# Patient Record
Sex: FEMALE | Race: WHITE | NOT HISPANIC OR LATINO | URBAN - METROPOLITAN AREA
[De-identification: names, ages, dates, MRNs, and addresses within clinical notes are randomized per-mention and may not be internally consistent; named-entity substitution may affect disease eponyms.]

---

## 2017-06-13 ENCOUNTER — OUTPATIENT (OUTPATIENT)
Dept: OUTPATIENT SERVICES | Facility: HOSPITAL | Age: 32
LOS: 1 days | Discharge: HOME | End: 2017-06-13

## 2017-06-13 DIAGNOSIS — E28.2 POLYCYSTIC OVARIAN SYNDROME: ICD-10-CM

## 2017-06-27 ENCOUNTER — OUTPATIENT (OUTPATIENT)
Dept: OUTPATIENT SERVICES | Facility: HOSPITAL | Age: 32
LOS: 1 days | Discharge: HOME | End: 2017-06-27

## 2017-06-28 DIAGNOSIS — N92.6 IRREGULAR MENSTRUATION, UNSPECIFIED: ICD-10-CM

## 2017-06-28 DIAGNOSIS — Z01.818 ENCOUNTER FOR OTHER PREPROCEDURAL EXAMINATION: ICD-10-CM

## 2017-06-28 DIAGNOSIS — N70.11 CHRONIC SALPINGITIS: ICD-10-CM

## 2017-07-11 DIAGNOSIS — N73.6 FEMALE PELVIC PERITONEAL ADHESIONS (POSTINFECTIVE): ICD-10-CM

## 2017-07-11 DIAGNOSIS — N70.11 CHRONIC SALPINGITIS: ICD-10-CM

## 2018-02-13 ENCOUNTER — OUTPATIENT (OUTPATIENT)
Dept: OUTPATIENT SERVICES | Facility: HOSPITAL | Age: 33
LOS: 1 days | Discharge: HOME | End: 2018-02-13

## 2018-02-13 DIAGNOSIS — Z34.00 ENCOUNTER FOR SUPERVISION OF NORMAL FIRST PREGNANCY, UNSPECIFIED TRIMESTER: ICD-10-CM

## 2018-04-10 ENCOUNTER — OUTPATIENT (OUTPATIENT)
Dept: OUTPATIENT SERVICES | Facility: HOSPITAL | Age: 33
LOS: 1 days | Discharge: HOME | End: 2018-04-10

## 2018-04-10 DIAGNOSIS — N89.8 OTHER SPECIFIED NONINFLAMMATORY DISORDERS OF VAGINA: ICD-10-CM

## 2018-05-01 ENCOUNTER — OUTPATIENT (OUTPATIENT)
Dept: OUTPATIENT SERVICES | Facility: HOSPITAL | Age: 33
LOS: 1 days | Discharge: HOME | End: 2018-05-01

## 2018-05-01 DIAGNOSIS — Z01.818 ENCOUNTER FOR OTHER PREPROCEDURAL EXAMINATION: ICD-10-CM

## 2018-05-01 LAB
ALBUMIN SERPL ELPH-MCNC: 3.3 G/DL — LOW (ref 3.5–5.2)
ALP SERPL-CCNC: 167 U/L — HIGH (ref 30–115)
ALT FLD-CCNC: 11 U/L — SIGNIFICANT CHANGE UP (ref 0–41)
ANION GAP SERPL CALC-SCNC: 18 MMOL/L — HIGH (ref 7–14)
APPEARANCE UR: (no result)
APTT BLD: 24.4 SEC — LOW (ref 27–39.2)
AST SERPL-CCNC: 17 U/L — SIGNIFICANT CHANGE UP (ref 0–41)
BACTERIA # UR AUTO: (no result) /HPF
BASOPHILS # BLD AUTO: 0.05 K/UL — SIGNIFICANT CHANGE UP (ref 0–0.2)
BASOPHILS NFR BLD AUTO: 0.6 % — SIGNIFICANT CHANGE UP (ref 0–1)
BILIRUB SERPL-MCNC: 0.4 MG/DL — SIGNIFICANT CHANGE UP (ref 0.2–1.2)
BILIRUB UR-MCNC: NEGATIVE — SIGNIFICANT CHANGE UP
BLD GP AB SCN SERPL QL: SIGNIFICANT CHANGE UP
BUN SERPL-MCNC: 9 MG/DL — LOW (ref 10–20)
CALCIUM SERPL-MCNC: 8.6 MG/DL — SIGNIFICANT CHANGE UP (ref 8.5–10.1)
CHLORIDE SERPL-SCNC: 101 MMOL/L — SIGNIFICANT CHANGE UP (ref 98–110)
CO2 SERPL-SCNC: 19 MMOL/L — SIGNIFICANT CHANGE UP (ref 17–32)
COLOR SPEC: YELLOW — SIGNIFICANT CHANGE UP
CREAT SERPL-MCNC: 0.6 MG/DL — LOW (ref 0.7–1.5)
DIFF PNL FLD: NEGATIVE — SIGNIFICANT CHANGE UP
EOSINOPHIL # BLD AUTO: 0.06 K/UL — SIGNIFICANT CHANGE UP (ref 0–0.7)
EOSINOPHIL NFR BLD AUTO: 0.7 % — SIGNIFICANT CHANGE UP (ref 0–8)
EPI CELLS # UR: (no result) /HPF
GLUCOSE SERPL-MCNC: 51 MG/DL — LOW (ref 70–99)
GLUCOSE UR QL: NEGATIVE MG/DL — SIGNIFICANT CHANGE UP
HCT VFR BLD CALC: 33.2 % — LOW (ref 37–47)
HGB BLD-MCNC: 10.2 G/DL — LOW (ref 12–16)
IMM GRANULOCYTES NFR BLD AUTO: 0.3 % — SIGNIFICANT CHANGE UP (ref 0.1–0.3)
INR BLD: 0.94 RATIO — SIGNIFICANT CHANGE UP (ref 0.65–1.3)
KETONES UR-MCNC: NEGATIVE — SIGNIFICANT CHANGE UP
LEUKOCYTE ESTERASE UR-ACNC: (no result)
LYMPHOCYTES # BLD AUTO: 2.42 K/UL — SIGNIFICANT CHANGE UP (ref 1.2–3.4)
LYMPHOCYTES # BLD AUTO: 27.1 % — SIGNIFICANT CHANGE UP (ref 20.5–51.1)
MCHC RBC-ENTMCNC: 20.6 PG — LOW (ref 27–31)
MCHC RBC-ENTMCNC: 30.7 G/DL — LOW (ref 32–37)
MCV RBC AUTO: 66.9 FL — LOW (ref 81–99)
MONOCYTES # BLD AUTO: 1 K/UL — HIGH (ref 0.1–0.6)
MONOCYTES NFR BLD AUTO: 11.2 % — HIGH (ref 1.7–9.3)
NEUTROPHILS # BLD AUTO: 5.36 K/UL — SIGNIFICANT CHANGE UP (ref 1.4–6.5)
NEUTROPHILS NFR BLD AUTO: 60.1 % — SIGNIFICANT CHANGE UP (ref 42.2–75.2)
NITRITE UR-MCNC: NEGATIVE — SIGNIFICANT CHANGE UP
NRBC # BLD: 0 /100 WBCS — SIGNIFICANT CHANGE UP (ref 0–0)
PH UR: 6.5 — SIGNIFICANT CHANGE UP (ref 5–8)
PLATELET # BLD AUTO: 393 K/UL — SIGNIFICANT CHANGE UP (ref 130–400)
POTASSIUM SERPL-MCNC: 4.4 MMOL/L — SIGNIFICANT CHANGE UP (ref 3.5–5)
POTASSIUM SERPL-SCNC: 4.4 MMOL/L — SIGNIFICANT CHANGE UP (ref 3.5–5)
PROT SERPL-MCNC: 5.8 G/DL — LOW (ref 6–8)
PROT UR-MCNC: NEGATIVE MG/DL — SIGNIFICANT CHANGE UP
PROTHROM AB SERPL-ACNC: 10.1 SEC — SIGNIFICANT CHANGE UP (ref 9.95–12.87)
RBC # BLD: 4.96 M/UL — SIGNIFICANT CHANGE UP (ref 4.2–5.4)
RBC # FLD: 18.6 % — HIGH (ref 11.5–14.5)
SODIUM SERPL-SCNC: 138 MMOL/L — SIGNIFICANT CHANGE UP (ref 135–146)
SP GR SPEC: 1.01 — SIGNIFICANT CHANGE UP (ref 1.01–1.03)
TYPE + AB SCN PNL BLD: SIGNIFICANT CHANGE UP
UROBILINOGEN FLD QL: 0.2 MG/DL — SIGNIFICANT CHANGE UP (ref 0.2–0.2)
WBC # BLD: 8.92 K/UL — SIGNIFICANT CHANGE UP (ref 4.8–10.8)
WBC # FLD AUTO: 8.92 K/UL — SIGNIFICANT CHANGE UP (ref 4.8–10.8)

## 2018-05-02 LAB — T PALLIDUM AB TITR SER: NEGATIVE — SIGNIFICANT CHANGE UP

## 2018-05-03 ENCOUNTER — RESULT REVIEW (OUTPATIENT)
Age: 33
End: 2018-05-03

## 2018-05-03 ENCOUNTER — INPATIENT (INPATIENT)
Facility: HOSPITAL | Age: 33
LOS: 2 days | Discharge: HOME | End: 2018-05-06
Attending: OBSTETRICS & GYNECOLOGY | Admitting: OBSTETRICS & GYNECOLOGY

## 2018-05-03 VITALS — TEMPERATURE: 98 F

## 2018-05-03 DIAGNOSIS — K95.09 OTHER COMPLICATIONS OF GASTRIC BAND PROCEDURE: Chronic | ICD-10-CM

## 2018-05-03 DIAGNOSIS — Z90.3 ACQUIRED ABSENCE OF STOMACH [PART OF]: Chronic | ICD-10-CM

## 2018-05-03 DIAGNOSIS — Z90.721 ACQUIRED ABSENCE OF OVARIES, UNILATERAL: Chronic | ICD-10-CM

## 2018-05-03 DIAGNOSIS — Z98.82 BREAST IMPLANT STATUS: Chronic | ICD-10-CM

## 2018-05-03 DIAGNOSIS — Z98.84 BARIATRIC SURGERY STATUS: Chronic | ICD-10-CM

## 2018-05-03 DIAGNOSIS — Z98.890 OTHER SPECIFIED POSTPROCEDURAL STATES: Chronic | ICD-10-CM

## 2018-05-03 LAB
AMPHET UR-MCNC: NEGATIVE — SIGNIFICANT CHANGE UP
APPEARANCE UR: (no result)
BACTERIA # UR AUTO: (no result) /HPF
BARBITURATES UR SCN-MCNC: NEGATIVE — SIGNIFICANT CHANGE UP
BENZODIAZ UR-MCNC: NEGATIVE — SIGNIFICANT CHANGE UP
BILIRUB UR-MCNC: (no result)
COCAINE METAB.OTHER UR-MCNC: NEGATIVE — SIGNIFICANT CHANGE UP
COLOR SPEC: SIGNIFICANT CHANGE UP
DIFF PNL FLD: NEGATIVE — SIGNIFICANT CHANGE UP
EPI CELLS # UR: (no result) /HPF
GLUCOSE UR QL: NEGATIVE MG/DL — SIGNIFICANT CHANGE UP
KETONES UR-MCNC: NEGATIVE — SIGNIFICANT CHANGE UP
LEUKOCYTE ESTERASE UR-ACNC: (no result)
METHADONE UR-MCNC: NEGATIVE — SIGNIFICANT CHANGE UP
NITRITE UR-MCNC: NEGATIVE — SIGNIFICANT CHANGE UP
OPIATES UR-MCNC: NEGATIVE — SIGNIFICANT CHANGE UP
PCP SPEC-MCNC: SIGNIFICANT CHANGE UP
PH UR: 6 — SIGNIFICANT CHANGE UP (ref 5–8)
PROPOXYPHENE QUALITATIVE URINE RESULT: NEGATIVE — SIGNIFICANT CHANGE UP
PROT UR-MCNC: 30 MG/DL
SP GR SPEC: 1.02 — SIGNIFICANT CHANGE UP (ref 1.01–1.03)
UROBILINOGEN FLD QL: 1 MG/DL (ref 0.2–0.2)
WBC UR QL: (no result) /HPF

## 2018-05-03 RX ORDER — KETOROLAC TROMETHAMINE 30 MG/ML
30 SYRINGE (ML) INJECTION ONCE
Qty: 0 | Refills: 0 | Status: CANCELLED | OUTPATIENT
Start: 2018-05-03 | End: 2018-05-06

## 2018-05-03 RX ORDER — MORPHINE SULFATE 50 MG/1
2 CAPSULE, EXTENDED RELEASE ORAL ONCE
Qty: 0 | Refills: 0 | Status: CANCELLED | OUTPATIENT
Start: 2018-05-03 | End: 2018-05-06

## 2018-05-03 RX ORDER — IBUPROFEN 200 MG
600 TABLET ORAL EVERY 6 HOURS
Qty: 0 | Refills: 0 | Status: DISCONTINUED | OUTPATIENT
Start: 2018-05-03 | End: 2018-05-06

## 2018-05-03 RX ORDER — CEFAZOLIN SODIUM 1 G
2000 VIAL (EA) INJECTION EVERY 8 HOURS
Qty: 0 | Refills: 0 | Status: DISCONTINUED | OUTPATIENT
Start: 2018-05-03 | End: 2018-05-06

## 2018-05-03 RX ORDER — SODIUM CHLORIDE 9 MG/ML
300 INJECTION, SOLUTION INTRAVENOUS ONCE
Qty: 0 | Refills: 0 | Status: DISCONTINUED | OUTPATIENT
Start: 2018-05-03 | End: 2018-05-03

## 2018-05-03 RX ORDER — OXYCODONE HYDROCHLORIDE 5 MG/1
10 TABLET ORAL
Qty: 0 | Refills: 0 | Status: DISCONTINUED | OUTPATIENT
Start: 2018-05-03 | End: 2018-05-06

## 2018-05-03 RX ORDER — CEFAZOLIN SODIUM 1 G
VIAL (EA) INJECTION
Qty: 0 | Refills: 0 | Status: DISCONTINUED | OUTPATIENT
Start: 2018-05-03 | End: 2018-05-03

## 2018-05-03 RX ORDER — SODIUM CHLORIDE 9 MG/ML
1000 INJECTION, SOLUTION INTRAVENOUS
Qty: 0 | Refills: 0 | Status: DISCONTINUED | OUTPATIENT
Start: 2018-05-03 | End: 2018-05-06

## 2018-05-03 RX ORDER — LANOLIN
1 OINTMENT (GRAM) TOPICAL
Qty: 0 | Refills: 0 | Status: DISCONTINUED | OUTPATIENT
Start: 2018-05-03 | End: 2018-05-06

## 2018-05-03 RX ORDER — SIMETHICONE 80 MG/1
80 TABLET, CHEWABLE ORAL EVERY 4 HOURS
Qty: 0 | Refills: 0 | Status: DISCONTINUED | OUTPATIENT
Start: 2018-05-03 | End: 2018-05-06

## 2018-05-03 RX ORDER — ENOXAPARIN SODIUM 100 MG/ML
40 INJECTION SUBCUTANEOUS AT BEDTIME
Qty: 0 | Refills: 0 | Status: DISCONTINUED | OUTPATIENT
Start: 2018-05-03 | End: 2018-05-06

## 2018-05-03 RX ORDER — CEFAZOLIN SODIUM 1 G
1000 VIAL (EA) INJECTION EVERY 8 HOURS
Qty: 0 | Refills: 0 | Status: DISCONTINUED | OUTPATIENT
Start: 2018-05-03 | End: 2018-05-03

## 2018-05-03 RX ORDER — SODIUM CHLORIDE 9 MG/ML
1000 INJECTION, SOLUTION INTRAVENOUS
Qty: 0 | Refills: 0 | Status: DISCONTINUED | OUTPATIENT
Start: 2018-05-03 | End: 2018-05-03

## 2018-05-03 RX ORDER — DOCUSATE SODIUM 100 MG
100 CAPSULE ORAL
Qty: 0 | Refills: 0 | Status: DISCONTINUED | OUTPATIENT
Start: 2018-05-03 | End: 2018-05-06

## 2018-05-03 RX ORDER — OXYTOCIN 10 UNIT/ML
41.67 VIAL (ML) INJECTION
Qty: 20 | Refills: 0 | Status: DISCONTINUED | OUTPATIENT
Start: 2018-05-03 | End: 2018-05-06

## 2018-05-03 RX ORDER — KETOROLAC TROMETHAMINE 30 MG/ML
30 SYRINGE (ML) INJECTION ONCE
Qty: 0 | Refills: 0 | Status: DISCONTINUED | OUTPATIENT
Start: 2018-05-03 | End: 2018-05-03

## 2018-05-03 RX ORDER — OXYCODONE AND ACETAMINOPHEN 5; 325 MG/1; MG/1
1 TABLET ORAL EVERY 4 HOURS
Qty: 0 | Refills: 0 | Status: CANCELLED | OUTPATIENT
Start: 2018-05-03 | End: 2018-05-06

## 2018-05-03 RX ORDER — GLYCERIN ADULT
1 SUPPOSITORY, RECTAL RECTAL AT BEDTIME
Qty: 0 | Refills: 0 | Status: DISCONTINUED | OUTPATIENT
Start: 2018-05-03 | End: 2018-05-06

## 2018-05-03 RX ORDER — ONDANSETRON 8 MG/1
4 TABLET, FILM COATED ORAL ONCE
Qty: 0 | Refills: 0 | Status: CANCELLED | OUTPATIENT
Start: 2018-05-03 | End: 2018-05-06

## 2018-05-03 RX ORDER — BUTORPHANOL TARTRATE 2 MG/ML
2 INJECTION, SOLUTION INTRAMUSCULAR; INTRAVENOUS ONCE
Qty: 0 | Refills: 0 | Status: DISCONTINUED | OUTPATIENT
Start: 2018-05-03 | End: 2018-05-06

## 2018-05-03 RX ORDER — NALOXONE HYDROCHLORIDE 4 MG/.1ML
0.1 SPRAY NASAL
Qty: 0 | Refills: 0 | Status: DISCONTINUED | OUTPATIENT
Start: 2018-05-03 | End: 2018-05-06

## 2018-05-03 RX ORDER — DIPHENHYDRAMINE HCL 50 MG
25 CAPSULE ORAL EVERY 6 HOURS
Qty: 0 | Refills: 0 | Status: DISCONTINUED | OUTPATIENT
Start: 2018-05-03 | End: 2018-05-06

## 2018-05-03 RX ORDER — KETOROLAC TROMETHAMINE 30 MG/ML
30 SYRINGE (ML) INJECTION ONCE
Qty: 0 | Refills: 0 | Status: DISCONTINUED | OUTPATIENT
Start: 2018-05-03 | End: 2018-05-06

## 2018-05-03 RX ORDER — FERROUS SULFATE 325(65) MG
325 TABLET ORAL DAILY
Qty: 0 | Refills: 0 | Status: DISCONTINUED | OUTPATIENT
Start: 2018-05-03 | End: 2018-05-06

## 2018-05-03 RX ORDER — OXYCODONE AND ACETAMINOPHEN 5; 325 MG/1; MG/1
2 TABLET ORAL EVERY 6 HOURS
Qty: 0 | Refills: 0 | Status: CANCELLED | OUTPATIENT
Start: 2018-05-03 | End: 2018-05-06

## 2018-05-03 RX ORDER — CEFAZOLIN SODIUM 1 G
2000 VIAL (EA) INJECTION ONCE
Qty: 0 | Refills: 0 | Status: COMPLETED | OUTPATIENT
Start: 2018-05-03 | End: 2018-05-03

## 2018-05-03 RX ORDER — CITRIC ACID/SODIUM CITRATE 300-500 MG
15 SOLUTION, ORAL ORAL EVERY 4 HOURS
Qty: 0 | Refills: 0 | Status: DISCONTINUED | OUTPATIENT
Start: 2018-05-03 | End: 2018-05-03

## 2018-05-03 RX ORDER — MORPHINE SULFATE 50 MG/1
0.2 CAPSULE, EXTENDED RELEASE ORAL ONCE
Qty: 0 | Refills: 0 | Status: DISCONTINUED | OUTPATIENT
Start: 2018-05-03 | End: 2018-05-06

## 2018-05-03 RX ORDER — OXYCODONE AND ACETAMINOPHEN 5; 325 MG/1; MG/1
2 TABLET ORAL EVERY 6 HOURS
Qty: 0 | Refills: 0 | Status: DISCONTINUED | OUTPATIENT
Start: 2018-05-03 | End: 2018-05-06

## 2018-05-03 RX ORDER — ONDANSETRON 8 MG/1
4 TABLET, FILM COATED ORAL EVERY 6 HOURS
Qty: 0 | Refills: 0 | Status: DISCONTINUED | OUTPATIENT
Start: 2018-05-03 | End: 2018-05-06

## 2018-05-03 RX ORDER — OXYCODONE HYDROCHLORIDE 5 MG/1
5 TABLET ORAL
Qty: 0 | Refills: 0 | Status: DISCONTINUED | OUTPATIENT
Start: 2018-05-03 | End: 2018-05-06

## 2018-05-03 RX ORDER — OXYTOCIN 10 UNIT/ML
333.33 VIAL (ML) INJECTION
Qty: 20 | Refills: 0 | Status: DISCONTINUED | OUTPATIENT
Start: 2018-05-03 | End: 2018-05-03

## 2018-05-03 RX ORDER — OXYCODONE AND ACETAMINOPHEN 5; 325 MG/1; MG/1
1 TABLET ORAL
Qty: 0 | Refills: 0 | Status: DISCONTINUED | OUTPATIENT
Start: 2018-05-03 | End: 2018-05-06

## 2018-05-03 RX ORDER — ACETAMINOPHEN 500 MG
650 TABLET ORAL EVERY 6 HOURS
Qty: 0 | Refills: 0 | Status: DISCONTINUED | OUTPATIENT
Start: 2018-05-03 | End: 2018-05-06

## 2018-05-03 RX ADMIN — Medication 100 MILLIGRAM(S): at 22:04

## 2018-05-03 RX ADMIN — ENOXAPARIN SODIUM 40 MILLIGRAM(S): 100 INJECTION SUBCUTANEOUS at 22:04

## 2018-05-03 RX ADMIN — Medication 100 MILLIGRAM(S): at 10:26

## 2018-05-03 RX ADMIN — Medication 30 MILLIGRAM(S): at 23:26

## 2018-05-03 NOTE — BRIEF OPERATIVE NOTE - PROCEDURE
<<-----Click on this checkbox to enter Procedure Primary low transverse  section  2018    Active  ATANG2

## 2018-05-03 NOTE — OB PROVIDER H&P - PSH
Complication of gastric banding  2011, required blood transfusion, band removed at this time  H/O abdominoplasty  2013  H/O breast augmentation  2011  History of laparoscopic adjustable gastric banding  2005  History of sleeve gastrectomy  2013  S/P unilateral salpingo-oophorectomy  left

## 2018-05-03 NOTE — CHART NOTE - NSCHARTNOTEFT_GEN_A_CORE
PACU ANESTHESIA ADMISSION NOTE      Procedure: Primary low transverse  section    Post op diagnosis:  Breech presentation delivered  Term pregnancy delivered      ____  Intubated  TV:______       Rate: ______      FiO2: ______    _x___  Patent Airway    _x___  Full return of protective reflexes    ____  Full recovery from anesthesia / back to baseline status    Vitals  SPO2:-100% on room air  HR:-74  RR:-12  B.P:-119/56  Temp:-98.2    Mental Status:  _x___ Awake   ___x_ Alert   _____ Drowsy   _____ Sedated    Nausea/Vomiting:  _x___  NO       ______Yes,   See Post - Op Orders         Pain Scale (0-10):  __0___    Treatment: _x___ None    __x__ See Post - Op/PCA Orders    Post - Operative Fluids:   ___ Oral   ____x See Post - Op Orders    Plan: Discharge:   ____Home       ___x__Floor     _____Critical Care    _____  Other:_________________    Comments:  Report endorsed to RN in pacu  Vitals stable  No anesthesia issues or complications noted.  Discharge to patient to floor when criteria met.

## 2018-05-03 NOTE — OB PROVIDER H&P - HISTORY OF PRESENT ILLNESS
Pt is a 32y.o.  @ 39.5wks IVF pregnancy with Day 5 FET here for primary  for breech presentation. Pt denies ctx, ROM, VB and states good FM

## 2018-05-03 NOTE — PROGRESS NOTE ADULT - SUBJECTIVE AND OBJECTIVE BOX
PGY 1 Post Op c/s note    Pt seen and evaluated at bedside, POD0 , recovering well, no complaints at this time. Pt is OOB to chair, pain well controlled. Yi N/V or abdominal pain. urrently NPO  Billy in place.  Bottlefeeding.     Physical Exam  Vital Signs Last 24 Hrs  T(C): 35.9 (03 May 2018 15:31), Max: 36.7 (03 May 2018 09:43)  T(F): 96.7 (03 May 2018 15:31), Max: 98.1 (03 May 2018 09:43)  HR: 63 (03 May 2018 15:31) (59 - 83)  BP: 114/57 (03 May 2018 15:31) (104/59 - 125/85)  RR: 18 (03 May 2018 15:31) (18 - 20)  SpO2: 98% (03 May 2018 13:52) (97% - 99%)    UO 325cc in last 2 hours, 162cc/h (adequate is 42cc/h)    Gen: NAD  CVS: RRR, normal S1, S2  Lungs: CTAB  Abdomen: soft, non tender, non distended, +BS  -Incision: dressing in place. No surrounding tenderness or erythema  -Fundus: firm, appropriately tender, below umbilicus  LE: no edema or tenderness  Lochia: Minimal Rubra    Labs                        10.2   8.92  )-----------( 393      ( 01 May 2018 17:12 )             33.2         Meds  acetaminophen   Tablet 650 milliGRAM(s) Oral every 6 hours PRN  butorphanol Injectable 2 milliGRAM(s) IV Push once PRN  ceFAZolin   IVPB 2000 milliGRAM(s) IV Intermittent every 8 hours  diphenhydrAMINE   Capsule 25 milliGRAM(s) Oral every 6 hours PRN  docusate sodium 100 milliGRAM(s) Oral two times a day PRN  enoxaparin Injectable 40 milliGRAM(s) SubCutaneous at bedtime  ferrous    sulfate 325 milliGRAM(s) Oral daily  glycerin Suppository - Adult 1 Suppository(s) Rectal at bedtime PRN  ibuprofen  Tablet 600 milliGRAM(s) Oral every 6 hours PRN  ketorolac   Injectable 30 milliGRAM(s) IV Push once PRN  lactated ringers. 1000 milliLiter(s) IV Continuous <Continuous>  lactated ringers. 1000 milliLiter(s) IV Continuous <Continuous>  lanolin Ointment 1 Application(s) Topical every 3 hours PRN  morphine PF Spinal 0.2 milliGRAM(s) IntraThecal. once  naloxone Injectable 0.1 milliGRAM(s) IV Push every 3 minutes PRN  ondansetron Injectable 4 milliGRAM(s) IV Push every 6 hours PRN  oxyCODONE    5 mG/acetaminophen 325 mG 1 Tablet(s) Oral every 3 hours PRN  oxyCODONE    5 mG/acetaminophen 325 mG 2 Tablet(s) Oral every 6 hours PRN  oxyCODONE    IR 5 milliGRAM(s) Oral every 3 hours PRN  oxyCODONE    IR 10 milliGRAM(s) Oral every 3 hours PRN  oxytocin Infusion 41.667 milliUNIT(s)/Min IV Continuous <Continuous>  prenatal multivitamin 1 Tablet(s) Oral daily  simethicone 80 milliGRAM(s) Chew every 4 hours PRN      A/P  32y P1, s/p primary c/s for breech presentation POD0, recovering well.  -OOB to chair and clear liquids at 1800  -UO adequate, will d/c billy after duramorph if remains adequate.  -Encourage PO hydration, ambulation, incentive spirometry

## 2018-05-03 NOTE — BRIEF OPERATIVE NOTE - OPERATION/FINDINGS
Live male infant delivered in footling breech presentation, APGARs 9/9, bwt 3330g. Surgically absent left fallopian tube. Normal appearing right fallopian tube, uterus, and bilateral ovaries.

## 2018-05-03 NOTE — BRIEF OPERATIVE NOTE - POST-OP DX
Breech presentation delivered  05/03/2018    Ivon Cabezas  Term pregnancy delivered  05/03/2018    Ivon Cabezas

## 2018-05-03 NOTE — OB PROVIDER H&P - ASSESSMENT
32y.o.  @ 39.5wks primary  for breech presentation, IVF pregnancy, h/o lap band then gastric sleeve, h/o left salpingooophrectemy, s/p abdominoplasty, h/o blood transfusion  Admit to L&D  IVF, labs  Continuous efm and toco  Ancef prior to OR  Abdominal prep  Norwood catheter  Dr. Montgomery/ Dr. Moy Aware

## 2018-05-03 NOTE — OB PROVIDER H&P - NSHPLABSRESULTS_GEN_ALL_CORE
GTT Pt unable to tolerate, HGB A1c 4.9 3/19/18  NIPT 46XY WNL  Seq 1/ Seq 2 negative    Sono @ 12.3wks S=D, NT WNL  Sono @ 15.3wks S=D  Sono @ 19.4wks S=D, nl anatomy  Sono @ 29.4wks S=D, EFW: 1534g, 3'6" (58%) vtx. BPP 8/8, nl dopplers

## 2018-05-04 DIAGNOSIS — O34.211 MATERNAL CARE FOR LOW TRANSVERSE SCAR FROM PREVIOUS CESAREAN DELIVERY: ICD-10-CM

## 2018-05-04 LAB
FETAL SCREEN: SIGNIFICANT CHANGE UP
HCT VFR BLD CALC: 25.8 % — LOW (ref 37–47)
HGB BLD-MCNC: 8 G/DL — LOW (ref 12–16)
MCHC RBC-ENTMCNC: 20.6 PG — LOW (ref 27–31)
MCHC RBC-ENTMCNC: 31 G/DL — LOW (ref 32–37)
MCV RBC AUTO: 66.3 FL — LOW (ref 81–99)
NRBC # BLD: 0 /100 WBCS — SIGNIFICANT CHANGE UP (ref 0–0)
PLATELET # BLD AUTO: 298 K/UL — SIGNIFICANT CHANGE UP (ref 130–400)
RBC # BLD: 3.89 M/UL — LOW (ref 4.2–5.4)
RBC # FLD: 18.1 % — HIGH (ref 11.5–14.5)
WBC # BLD: 10.87 K/UL — HIGH (ref 4.8–10.8)
WBC # FLD AUTO: 10.87 K/UL — HIGH (ref 4.8–10.8)

## 2018-05-04 RX ADMIN — Medication 600 MILLIGRAM(S): at 23:59

## 2018-05-04 RX ADMIN — Medication 325 MILLIGRAM(S): at 13:46

## 2018-05-04 RX ADMIN — Medication 600 MILLIGRAM(S): at 08:59

## 2018-05-04 RX ADMIN — Medication 1 TABLET(S): at 13:46

## 2018-05-04 RX ADMIN — ENOXAPARIN SODIUM 40 MILLIGRAM(S): 100 INJECTION SUBCUTANEOUS at 21:20

## 2018-05-04 RX ADMIN — Medication 30 MILLIGRAM(S): at 00:07

## 2018-05-04 RX ADMIN — SIMETHICONE 80 MILLIGRAM(S): 80 TABLET, CHEWABLE ORAL at 05:45

## 2018-05-04 RX ADMIN — Medication 600 MILLIGRAM(S): at 17:58

## 2018-05-04 NOTE — PROGRESS NOTE ADULT - ASSESSMENT
S/p pLTCS POD 1, doing well  - TOV by 1000  - Advance diet as tolerated  - F/u VS  - F/u Labs  - Cont current mgt  - Pain mgt prn  - Encourage oral hydration, ambulation, incentive spirometer use    DR. Moy to be made aware S/p pLTCS POD 1, doing well  - TOV by 1030  - Advance diet as tolerated  - F/u VS  - F/u Labs  - Cont current mgt  - Pain mgt prn  - Encourage oral hydration, ambulation, incentive spirometer use    DR. Moy to be made aware

## 2018-05-04 NOTE — PROGRESS NOTE ADULT - SUBJECTIVE AND OBJECTIVE BOX
PGY 2 Note:    Pt doing well, pain well controlled. No acute complaints. Denies CP, SOB, N/V.    Ambulating: No  Norwood catheter removed, TOV by 1000  Flatus: No  Bowel movements: No  Breast or bottle feeding: Breast  Diet: Clears    PAST MEDICAL & SURGICAL HISTORY:  No pertinent past medical history  H/O abdominoplasty:   H/O breast augmentation:   History of laparoscopic adjustable gastric bandin  Complication of gastric bandin, required blood transfusion, band removed at this time  S/P unilateral salpingo-oophorectomy: left  History of sleeve gastrectomy:       Physical Exam  Vital Signs Last 24 Hrs  T(F): 98.6 (04 May 2018 00:20), Max: 98.6 (04 May 2018 00:20)  HR: 61 (04 May 2018 00:20) (59 - 84)  BP: 119/56 (04 May 2018 00:20) (104/59 - 125/85)  RR: 20 (04 May 2018 00:20) (18 - 20)  UO 1400cc overnight    Gen: AAOx3, NAD  Abd: Soft, appropriately tender, mildly distended, BS+  Incision: Incision c/d/i, no erythema/induration/drainage, steri strips in place  Fundus: Firm, appropriately tender, below the umbilicus  Lochia: Normal  Ext: No calf tenderness, no swelling, SCDs in place    Labs:                        10.2   8.92  )-----------( 393      ( 01 May 2018 17:12 )             33.2 PGY 2 Note:    Pt doing well, pain well controlled. No acute complaints. Denies CP, SOB, N/V.    Ambulating: No  Norwood catheter removed, TOV by 1000  Flatus: No  Bowel movements: No  Breast or bottle feeding: Breast  Diet: Clears    PAST MEDICAL & SURGICAL HISTORY:  No pertinent past medical history  H/O abdominoplasty:   H/O breast augmentation:   History of laparoscopic adjustable gastric bandin  Complication of gastric bandin, required blood transfusion, band removed at this time  S/P unilateral salpingo-oophorectomy: left  History of sleeve gastrectomy:       Physical Exam  Vital Signs Last 24 Hrs  T(F): 98.6 (04 May 2018 00:20), Max: 98.6 (04 May 2018 00:20)  HR: 61 (04 May 2018 00:20) (59 - 84)  BP: 119/56 (04 May 2018 00:20) (104/59 - 125/85)  RR: 20 (04 May 2018 00:20) (18 - 20)  UO 1400cc overnight    GenAAOx3, NAD  Heart: S1S2, RRR  Lungs: CTAB  Abd: Soft, appropriately tender, mildly distended, BS+  Incision: Incision c/d/i, no erythema/induration/drainage, steri strips in place  Fundus: Firm, appropriately tender, below the umbilicus  Lochia: Normal  Ext: No calf tenderness, no swelling, SCDs in place    Labs:                        10.2   8.92  )-----------( 393      ( 01 May 2018 17:12 )             33.2

## 2018-05-05 RX ADMIN — Medication 600 MILLIGRAM(S): at 16:36

## 2018-05-05 RX ADMIN — Medication 1 TABLET(S): at 11:50

## 2018-05-05 RX ADMIN — ENOXAPARIN SODIUM 40 MILLIGRAM(S): 100 INJECTION SUBCUTANEOUS at 21:23

## 2018-05-05 RX ADMIN — Medication 600 MILLIGRAM(S): at 21:26

## 2018-05-05 RX ADMIN — Medication 600 MILLIGRAM(S): at 15:10

## 2018-05-05 RX ADMIN — Medication 325 MILLIGRAM(S): at 11:50

## 2018-05-05 NOTE — PROGRESS NOTE ADULT - SUBJECTIVE AND OBJECTIVE BOX
Postpartum Note,  Section  She is a  32y woman who is now post-operative day: 2    Subjective:  The patient feels well.  She is ambulating.   She is tolerating regular diet.  She denies nausea and vomiting.  She is passing flatus.  She is voiding.  Her pain is controlled.  She reports normal postpartum bleeding.    Physical exam:    Vital Signs  T(F): 98.9, Max: 98.9   HR: 72  (68 - 104)  BP: 104/55 (104/55 - 114/68)  RR: 18  (18 - 20)    Gen: NAD  Breast: Soft, nontender, not engorged.  Abdomen: Soft, nontender, no distension , firm uterine fundus at umbilicus.  Incision: Clean, dry, and intact with steri strips  Pelvic: Normal lochia noted  Ext: No calf tenderness, no edema    LABS:                        8.0    10.87 )-----------( 298      ( 04 May 2018 07:47 )             25.8       Assessment and Plan  POD #2 s/p  section  Doing well.  Encourage ambulation.  Anticipate d/c tomorrow.  PMD to assess.

## 2018-05-06 ENCOUNTER — TRANSCRIPTION ENCOUNTER (OUTPATIENT)
Age: 33
End: 2018-05-06

## 2018-05-06 VITALS
TEMPERATURE: 98 F | RESPIRATION RATE: 20 BRPM | HEART RATE: 74 BPM | SYSTOLIC BLOOD PRESSURE: 120 MMHG | DIASTOLIC BLOOD PRESSURE: 76 MMHG

## 2018-05-06 RX ORDER — METFORMIN HYDROCHLORIDE 850 MG/1
500 TABLET ORAL
Qty: 0 | Refills: 0 | COMMUNITY

## 2018-05-06 RX ORDER — ACETAMINOPHEN 500 MG
2 TABLET ORAL
Qty: 0 | Refills: 0 | DISCHARGE
Start: 2018-05-06

## 2018-05-06 RX ORDER — IBUPROFEN 200 MG
1 TABLET ORAL
Qty: 0 | Refills: 0 | DISCHARGE
Start: 2018-05-06

## 2018-05-06 RX ADMIN — Medication 100 MILLIGRAM(S): at 10:58

## 2018-05-06 RX ADMIN — Medication 1 TABLET(S): at 10:55

## 2018-05-06 RX ADMIN — Medication 600 MILLIGRAM(S): at 10:55

## 2018-05-06 RX ADMIN — Medication 325 MILLIGRAM(S): at 10:54

## 2018-05-06 NOTE — PROGRESS NOTE ADULT - SUBJECTIVE AND OBJECTIVE BOX
PGY 1 Post Op c/s note    Pt seen and evaluated at bedside, POD3, recovering well, no complaints.   Pt is ambulating, tolerating regular diet, voiding well and passing gas.  Breast/Bottlefeeding    Physical Exam  Vital Signs Last 24 Hrs  T(C): 36.4 (06 May 2018 07:20), Max: 36.5 (06 May 2018 00:08)  T(F): 97.6 (06 May 2018 07:20), Max: 97.7 (06 May 2018 00:08)  HR: 74 (06 May 2018 07:20) (74 - 91)  BP: 120/76 (06 May 2018 07:20) (115/66 - 120/76)  RR: 20 (06 May 2018 07:20) (18 - 20)    Gen: NAD  CVS: RRR, normal S1, S2  Lungs: CTAB  Abdomen: soft, non tender, non distended, +BS  -Incision: clean, dry, intact, steris in place. No surrounding tenderness. Mild skin irritation likely from abdominal pad.  -Fundus: firm, non tender, above umbilicus  LE: no edema or tenderness  Lochia: Minimal Rubra    Labs    10.87>8/25.8<298    Meds  acetaminophen   Tablet 650 milliGRAM(s) Oral every 6 hours PRN  butorphanol Injectable 2 milliGRAM(s) IV Push once PRN  ceFAZolin   IVPB 2000 milliGRAM(s) IV Intermittent every 8 hours  diphenhydrAMINE   Capsule 25 milliGRAM(s) Oral every 6 hours PRN  docusate sodium 100 milliGRAM(s) Oral two times a day PRN  enoxaparin Injectable 40 milliGRAM(s) SubCutaneous at bedtime  ferrous    sulfate 325 milliGRAM(s) Oral daily  glycerin Suppository - Adult 1 Suppository(s) Rectal at bedtime PRN  ibuprofen  Tablet 600 milliGRAM(s) Oral every 6 hours PRN  ketorolac   Injectable 30 milliGRAM(s) IV Push once PRN  lactated ringers. 1000 milliLiter(s) IV Continuous <Continuous>  lactated ringers. 1000 milliLiter(s) IV Continuous <Continuous>  lanolin Ointment 1 Application(s) Topical every 3 hours PRN  morphine PF Spinal 0.2 milliGRAM(s) IntraThecal. once  naloxone Injectable 0.1 milliGRAM(s) IV Push every 3 minutes PRN  ondansetron Injectable 4 milliGRAM(s) IV Push every 6 hours PRN  oxyCODONE    5 mG/acetaminophen 325 mG 1 Tablet(s) Oral every 3 hours PRN  oxyCODONE    5 mG/acetaminophen 325 mG 2 Tablet(s) Oral every 6 hours PRN  oxyCODONE    IR 5 milliGRAM(s) Oral every 3 hours PRN  oxyCODONE    IR 10 milliGRAM(s) Oral every 3 hours PRN  oxytocin Infusion 41.667 milliUNIT(s)/Min IV Continuous <Continuous>  prenatal multivitamin 1 Tablet(s) Oral daily  simethicone 80 milliGRAM(s) Chew every 4 hours PRN      A/P  32y P1, s/p primary  delivery for breech presentation, POD3, recovering well  -Encourage PO hydration, ambulation, incentive spirometry  -Discharge home today (as per PMD)  -Discharge instructions and precautions given  -Follow up in 1 and 6 weeks for incision and PP check.

## 2018-05-06 NOTE — DISCHARGE NOTE OB - PLAN OF CARE
Healthy Mother and Baby Keep incisions clean and dry. No heavy lifting x 4 weeks. Nothing in the vagina for 6 weeks - no sex, tampons, douching, tub baths or pools. May Shower.

## 2018-05-06 NOTE — DISCHARGE NOTE OB - MEDICATION SUMMARY - MEDICATIONS TO TAKE
I will START or STAY ON the medications listed below when I get home from the hospital:    acetaminophen 325 mg oral tablet  -- 2 tab(s) by mouth every 6 hours, As needed, For Temp greater than 38.5 C (101.3 F)  -- Indication: For  / BREECH    oxyCODONE-acetaminophen 5 mg-325 mg oral tablet  -- 1 tab(s) by mouth every 3 hours, As needed, Moderate Pain  -- Indication: For  / BREECH    ibuprofen 600 mg oral tablet  -- 1 tab(s) by mouth every 6 hours, As needed, Mild pain or headache  -- Indication: For  / BREECH

## 2018-05-06 NOTE — DISCHARGE NOTE OB - PATIENT PORTAL LINK FT
You can access the [facility] Patient Portal, offered by [facility], by registering with the following website: [email or web address]

## 2018-05-06 NOTE — DISCHARGE NOTE OB - CARE PROVIDER_API CALL
Estrada Moy), Obstetrics and Gynecology  [address] [place] [address]  Phone: [phone]  Fax: [phone]

## 2018-05-06 NOTE — DISCHARGE NOTE OB - HOSPITAL COURSE
32y P1 s/p uncomplicated  delivery and postop course, recovering well. Ambulating, voiding well, tolerating PO, minimal pain and lochia. Discharged home on POD3. Discharge instructions and precautions given, will follow up in 1 and 6 weeks with PMD for incision check and postpartum visit.

## 2018-05-12 LAB — SURGICAL PATHOLOGY STUDY: SIGNIFICANT CHANGE UP

## 2018-05-16 DIAGNOSIS — Z90.79 ACQUIRED ABSENCE OF OTHER GENITAL ORGAN(S): ICD-10-CM

## 2018-05-16 DIAGNOSIS — Z3A.39 39 WEEKS GESTATION OF PREGNANCY: ICD-10-CM

## 2018-05-16 DIAGNOSIS — Z98.84 BARIATRIC SURGERY STATUS: ICD-10-CM

## 2018-05-16 DIAGNOSIS — Z90.721 ACQUIRED ABSENCE OF OVARIES, UNILATERAL: ICD-10-CM

## 2018-05-16 DIAGNOSIS — O32.8XX0 MATERNAL CARE FOR OTHER MALPRESENTATION OF FETUS, NOT APPLICABLE OR UNSPECIFIED: ICD-10-CM

## 2018-05-16 DIAGNOSIS — O26.893 OTHER SPECIFIED PREGNANCY RELATED CONDITIONS, THIRD TRIMESTER: ICD-10-CM

## 2018-08-01 ENCOUNTER — RESULT REVIEW (OUTPATIENT)
Age: 33
End: 2018-08-01

## 2018-08-01 ENCOUNTER — OUTPATIENT (OUTPATIENT)
Dept: OUTPATIENT SERVICES | Facility: HOSPITAL | Age: 33
LOS: 1 days | Discharge: HOME | End: 2018-08-01

## 2018-08-01 DIAGNOSIS — K95.09 OTHER COMPLICATIONS OF GASTRIC BAND PROCEDURE: Chronic | ICD-10-CM

## 2018-08-01 DIAGNOSIS — Z90.721 ACQUIRED ABSENCE OF OVARIES, UNILATERAL: Chronic | ICD-10-CM

## 2018-08-01 DIAGNOSIS — Z98.890 OTHER SPECIFIED POSTPROCEDURAL STATES: Chronic | ICD-10-CM

## 2018-08-01 DIAGNOSIS — Z98.82 BREAST IMPLANT STATUS: Chronic | ICD-10-CM

## 2018-08-01 DIAGNOSIS — Z90.3 ACQUIRED ABSENCE OF STOMACH [PART OF]: Chronic | ICD-10-CM

## 2018-08-01 DIAGNOSIS — Z98.84 BARIATRIC SURGERY STATUS: Chronic | ICD-10-CM

## 2018-08-02 DIAGNOSIS — R87.612 LOW GRADE SQUAMOUS INTRAEPITHELIAL LESION ON CYTOLOGIC SMEAR OF CERVIX (LGSIL): ICD-10-CM

## 2020-01-15 ENCOUNTER — RESULT REVIEW (OUTPATIENT)
Age: 35
End: 2020-01-15

## 2021-03-23 ENCOUNTER — APPOINTMENT (OUTPATIENT)
Dept: MATERNAL FETAL MEDICINE | Facility: CLINIC | Age: 36
End: 2021-03-23
Payer: COMMERCIAL

## 2021-03-23 PROCEDURE — 99072 ADDL SUPL MATRL&STAF TM PHE: CPT

## 2021-03-23 PROCEDURE — 76801 OB US < 14 WKS SINGLE FETUS: CPT

## 2021-03-23 PROCEDURE — 76813 OB US NUCHAL MEAS 1 GEST: CPT

## 2021-04-09 ENCOUNTER — NON-APPOINTMENT (OUTPATIENT)
Age: 36
End: 2021-04-09

## 2021-04-09 DIAGNOSIS — O09.819 SUPERVISION OF PREGNANCY RESULTING FROM ASSISTED REPRODUCTIVE TECHNOLOGY, UNSPECIFIED TRIMESTER: ICD-10-CM

## 2021-04-09 DIAGNOSIS — Z36.82 ENCOUNTER FOR ANTENATAL SCREENING FOR NUCHAL TRANSLUCENCY: ICD-10-CM

## 2021-04-09 DIAGNOSIS — Z34.90 ENCOUNTER FOR SUPERVISION OF NORMAL PREGNANCY, UNSPECIFIED, UNSPECIFIED TRIMESTER: ICD-10-CM

## 2021-04-09 PROBLEM — Z00.00 ENCOUNTER FOR PREVENTIVE HEALTH EXAMINATION: Status: ACTIVE | Noted: 2021-04-09

## 2021-05-19 ENCOUNTER — APPOINTMENT (OUTPATIENT)
Dept: ANTEPARTUM | Facility: CLINIC | Age: 36
End: 2021-05-19
Payer: COMMERCIAL

## 2021-05-19 ENCOUNTER — APPOINTMENT (OUTPATIENT)
Dept: MATERNAL FETAL MEDICINE | Facility: CLINIC | Age: 36
End: 2021-05-19

## 2021-05-19 ENCOUNTER — ASOB RESULT (OUTPATIENT)
Age: 36
End: 2021-05-19

## 2021-05-19 VITALS
BODY MASS INDEX: 28.51 KG/M2 | WEIGHT: 167 LBS | SYSTOLIC BLOOD PRESSURE: 121 MMHG | HEART RATE: 72 BPM | HEIGHT: 64 IN | TEMPERATURE: 98.1 F | DIASTOLIC BLOOD PRESSURE: 80 MMHG

## 2021-05-19 PROCEDURE — 76817 TRANSVAGINAL US OBSTETRIC: CPT

## 2021-05-19 PROCEDURE — 99072 ADDL SUPL MATRL&STAF TM PHE: CPT

## 2021-05-19 PROCEDURE — 76811 OB US DETAILED SNGL FETUS: CPT

## 2021-05-27 NOTE — PROGRESS NOTE ADULT - ATTENDING COMMENTS
IMP: s/p LTCS - POD 2 - Doing Well    Plan:  regular diet, ambulation, nuursing, anticipated d/c - 5/6
as above, pt seen at bedside, agree with findings, impression and plan    pt received rhogam    IMP: s/p LTCS - POD 3 - Doing Well    Plan: D/C home, NSAID's/PNV's, f/u office - 1 week
as above, pt seen atr bedside, agree with findings, impression and plan                          8.0    10.87 )-----------( 298      ( 04 May 2018 07:47 )             25.8     IMP:  s/p LTCS - POD 1 - Doing Well    Plan:  OOB, fulls to regular diet, ambulation
Hold Metformin   - continue low dose insulin sliding scale with accuchecks and hypoglycemia protocol  - follow up AM hemoglobin A1C

## 2021-06-02 ENCOUNTER — APPOINTMENT (OUTPATIENT)
Dept: ANTEPARTUM | Facility: CLINIC | Age: 36
End: 2021-06-02
Payer: COMMERCIAL

## 2021-06-02 ENCOUNTER — ASOB RESULT (OUTPATIENT)
Age: 36
End: 2021-06-02

## 2021-06-02 VITALS
TEMPERATURE: 98 F | SYSTOLIC BLOOD PRESSURE: 120 MMHG | HEIGHT: 64 IN | DIASTOLIC BLOOD PRESSURE: 80 MMHG | BODY MASS INDEX: 28.85 KG/M2 | HEART RATE: 76 BPM | WEIGHT: 169 LBS

## 2021-06-02 DIAGNOSIS — Z03.73 ENCOUNTER FOR SUSPECTED FETAL ANOMALY RULED OUT: ICD-10-CM

## 2021-06-02 DIAGNOSIS — O35.9XX0 MATERNAL CARE FOR (SUSPECTED) FETAL ABNORMALITY AND DAMAGE, UNSPECIFIED, NOT APPLICABLE OR UNSPECIFIED: ICD-10-CM

## 2021-06-02 PROCEDURE — 76816 OB US FOLLOW-UP PER FETUS: CPT

## 2021-06-02 PROCEDURE — 99072 ADDL SUPL MATRL&STAF TM PHE: CPT

## 2021-07-21 ENCOUNTER — APPOINTMENT (OUTPATIENT)
Dept: ANTEPARTUM | Facility: CLINIC | Age: 36
End: 2021-07-21
Payer: COMMERCIAL

## 2021-07-21 ENCOUNTER — ASOB RESULT (OUTPATIENT)
Age: 36
End: 2021-07-21

## 2021-07-21 VITALS
HEIGHT: 64 IN | WEIGHT: 172 LBS | SYSTOLIC BLOOD PRESSURE: 118 MMHG | TEMPERATURE: 98 F | DIASTOLIC BLOOD PRESSURE: 80 MMHG | BODY MASS INDEX: 29.37 KG/M2

## 2021-07-21 DIAGNOSIS — O44.20 PARTIAL PLACENTA PREVIA NOS OR WITHOUT HEMORRHAGE, UNSPECIFIED TRIMESTER: ICD-10-CM

## 2021-07-21 DIAGNOSIS — O41.03X0 OLIGOHYDRAMNIOS, THIRD TRIMESTER, NOT APPLICABLE OR UNSPECIFIED: ICD-10-CM

## 2021-07-21 PROCEDURE — 99072 ADDL SUPL MATRL&STAF TM PHE: CPT

## 2021-07-21 PROCEDURE — 76819 FETAL BIOPHYS PROFIL W/O NST: CPT

## 2021-07-21 PROCEDURE — 76817 TRANSVAGINAL US OBSTETRIC: CPT

## 2021-07-23 ENCOUNTER — ASOB RESULT (OUTPATIENT)
Age: 36
End: 2021-07-23

## 2021-07-23 ENCOUNTER — APPOINTMENT (OUTPATIENT)
Dept: ANTEPARTUM | Facility: CLINIC | Age: 36
End: 2021-07-23
Payer: COMMERCIAL

## 2021-07-23 VITALS
BODY MASS INDEX: 29.19 KG/M2 | WEIGHT: 171 LBS | HEIGHT: 64 IN | DIASTOLIC BLOOD PRESSURE: 80 MMHG | SYSTOLIC BLOOD PRESSURE: 112 MMHG | TEMPERATURE: 98 F

## 2021-07-23 PROCEDURE — 76819 FETAL BIOPHYS PROFIL W/O NST: CPT | Mod: 26

## 2021-07-23 PROCEDURE — 99072 ADDL SUPL MATRL&STAF TM PHE: CPT

## 2021-08-25 ENCOUNTER — APPOINTMENT (OUTPATIENT)
Dept: ANTEPARTUM | Facility: CLINIC | Age: 36
End: 2021-08-25
Payer: COMMERCIAL

## 2021-08-25 ENCOUNTER — ASOB RESULT (OUTPATIENT)
Age: 36
End: 2021-08-25

## 2021-08-25 VITALS
SYSTOLIC BLOOD PRESSURE: 110 MMHG | DIASTOLIC BLOOD PRESSURE: 78 MMHG | BODY MASS INDEX: 30.05 KG/M2 | HEIGHT: 64 IN | WEIGHT: 176 LBS

## 2021-08-25 PROCEDURE — 76816 OB US FOLLOW-UP PER FETUS: CPT

## 2021-08-25 PROCEDURE — 76819 FETAL BIOPHYS PROFIL W/O NST: CPT

## 2021-09-27 ENCOUNTER — APPOINTMENT (OUTPATIENT)
Dept: ANTEPARTUM | Facility: CLINIC | Age: 36
End: 2021-09-27
Payer: COMMERCIAL

## 2021-09-27 ENCOUNTER — ASOB RESULT (OUTPATIENT)
Age: 36
End: 2021-09-27

## 2021-09-27 VITALS
SYSTOLIC BLOOD PRESSURE: 116 MMHG | BODY MASS INDEX: 50.02 KG/M2 | DIASTOLIC BLOOD PRESSURE: 80 MMHG | WEIGHT: 293 LBS | HEIGHT: 64 IN

## 2021-09-27 DIAGNOSIS — O09.819 SUPERVISION OF PREGNANCY RESULTING FROM ASSISTED REPRODUCTIVE TECHNOLOGY, UNSPECIFIED TRIMESTER: ICD-10-CM

## 2021-09-27 PROCEDURE — 76816 OB US FOLLOW-UP PER FETUS: CPT | Mod: 26

## 2021-09-27 PROCEDURE — 76819 FETAL BIOPHYS PROFIL W/O NST: CPT | Mod: 26

## 2021-10-08 ENCOUNTER — OUTPATIENT (OUTPATIENT)
Dept: OUTPATIENT SERVICES | Facility: HOSPITAL | Age: 36
LOS: 1 days | Discharge: HOME | End: 2021-10-08

## 2021-10-08 DIAGNOSIS — Z98.890 OTHER SPECIFIED POSTPROCEDURAL STATES: Chronic | ICD-10-CM

## 2021-10-08 DIAGNOSIS — Z98.84 BARIATRIC SURGERY STATUS: Chronic | ICD-10-CM

## 2021-10-08 DIAGNOSIS — Z90.3 ACQUIRED ABSENCE OF STOMACH [PART OF]: Chronic | ICD-10-CM

## 2021-10-08 DIAGNOSIS — Z11.59 ENCOUNTER FOR SCREENING FOR OTHER VIRAL DISEASES: ICD-10-CM

## 2021-10-08 DIAGNOSIS — Z98.82 BREAST IMPLANT STATUS: Chronic | ICD-10-CM

## 2021-10-08 DIAGNOSIS — K95.09 OTHER COMPLICATIONS OF GASTRIC BAND PROCEDURE: Chronic | ICD-10-CM

## 2021-10-08 DIAGNOSIS — Z90.721 ACQUIRED ABSENCE OF OVARIES, UNILATERAL: Chronic | ICD-10-CM

## 2021-10-10 ENCOUNTER — INPATIENT (INPATIENT)
Facility: HOSPITAL | Age: 36
LOS: 1 days | Discharge: HOME | End: 2021-10-12
Attending: OBSTETRICS & GYNECOLOGY | Admitting: OBSTETRICS & GYNECOLOGY
Payer: COMMERCIAL

## 2021-10-10 VITALS
RESPIRATION RATE: 16 BRPM | DIASTOLIC BLOOD PRESSURE: 81 MMHG | HEART RATE: 82 BPM | TEMPERATURE: 99 F | SYSTOLIC BLOOD PRESSURE: 139 MMHG

## 2021-10-10 DIAGNOSIS — Z98.890 OTHER SPECIFIED POSTPROCEDURAL STATES: Chronic | ICD-10-CM

## 2021-10-10 DIAGNOSIS — Z98.82 BREAST IMPLANT STATUS: Chronic | ICD-10-CM

## 2021-10-10 DIAGNOSIS — Z90.721 ACQUIRED ABSENCE OF OVARIES, UNILATERAL: Chronic | ICD-10-CM

## 2021-10-10 DIAGNOSIS — K95.09 OTHER COMPLICATIONS OF GASTRIC BAND PROCEDURE: Chronic | ICD-10-CM

## 2021-10-10 DIAGNOSIS — Z90.3 ACQUIRED ABSENCE OF STOMACH [PART OF]: Chronic | ICD-10-CM

## 2021-10-10 DIAGNOSIS — Z98.84 BARIATRIC SURGERY STATUS: Chronic | ICD-10-CM

## 2021-10-10 LAB
ALLERGY+IMMUNOLOGY DIAG STUDY NOTE: SIGNIFICANT CHANGE UP
APPEARANCE UR: CLEAR — SIGNIFICANT CHANGE UP
BACTERIA # UR AUTO: ABNORMAL
BASOPHILS # BLD AUTO: 0.04 K/UL — SIGNIFICANT CHANGE UP (ref 0–0.2)
BASOPHILS NFR BLD AUTO: 0.4 % — SIGNIFICANT CHANGE UP (ref 0–1)
BILIRUB UR-MCNC: NEGATIVE — SIGNIFICANT CHANGE UP
BLD GP AB SCN SERPL QL: SIGNIFICANT CHANGE UP
COLOR SPEC: YELLOW — SIGNIFICANT CHANGE UP
DIFF PNL FLD: ABNORMAL
DIR ANTIGLOB POLYSPECIFIC INTERPRETATION: SIGNIFICANT CHANGE UP
EOSINOPHIL # BLD AUTO: 0.06 K/UL — SIGNIFICANT CHANGE UP (ref 0–0.7)
EOSINOPHIL NFR BLD AUTO: 0.6 % — SIGNIFICANT CHANGE UP (ref 0–8)
GLUCOSE UR QL: NEGATIVE — SIGNIFICANT CHANGE UP
HCT VFR BLD CALC: 30.1 % — LOW (ref 37–47)
HGB BLD-MCNC: 9.1 G/DL — LOW (ref 12–16)
IMM GRANULOCYTES NFR BLD AUTO: 0.4 % — HIGH (ref 0.1–0.3)
KETONES UR-MCNC: NEGATIVE — SIGNIFICANT CHANGE UP
LEUKOCYTE ESTERASE UR-ACNC: NEGATIVE — SIGNIFICANT CHANGE UP
LYMPHOCYTES # BLD AUTO: 2.36 K/UL — SIGNIFICANT CHANGE UP (ref 1.2–3.4)
LYMPHOCYTES # BLD AUTO: 24.4 % — SIGNIFICANT CHANGE UP (ref 20.5–51.1)
MCHC RBC-ENTMCNC: 19.7 PG — LOW (ref 27–31)
MCHC RBC-ENTMCNC: 30.2 G/DL — LOW (ref 32–37)
MCV RBC AUTO: 65.3 FL — LOW (ref 81–99)
MONOCYTES # BLD AUTO: 0.91 K/UL — HIGH (ref 0.1–0.6)
MONOCYTES NFR BLD AUTO: 9.4 % — HIGH (ref 1.7–9.3)
NEUTROPHILS # BLD AUTO: 6.27 K/UL — SIGNIFICANT CHANGE UP (ref 1.4–6.5)
NEUTROPHILS NFR BLD AUTO: 64.8 % — SIGNIFICANT CHANGE UP (ref 42.2–75.2)
NITRITE UR-MCNC: NEGATIVE — SIGNIFICANT CHANGE UP
NRBC # BLD: 0 /100 WBCS — SIGNIFICANT CHANGE UP (ref 0–0)
PH UR: 6.5 — SIGNIFICANT CHANGE UP (ref 5–8)
PLATELET # BLD AUTO: 369 K/UL — SIGNIFICANT CHANGE UP (ref 130–400)
PRENATAL SYPHILIS TEST: SIGNIFICANT CHANGE UP
PROT UR-MCNC: SIGNIFICANT CHANGE UP
RBC # BLD: 4.61 M/UL — SIGNIFICANT CHANGE UP (ref 4.2–5.4)
RBC # FLD: 18.8 % — HIGH (ref 11.5–14.5)
RBC CASTS # UR COMP ASSIST: 20 /HPF — HIGH (ref 0–4)
SP GR SPEC: 1.01 — SIGNIFICANT CHANGE UP (ref 1.01–1.03)
UROBILINOGEN FLD QL: SIGNIFICANT CHANGE UP
WBC # BLD: 9.68 K/UL — SIGNIFICANT CHANGE UP (ref 4.8–10.8)
WBC # FLD AUTO: 9.68 K/UL — SIGNIFICANT CHANGE UP (ref 4.8–10.8)

## 2021-10-10 PROCEDURE — 86077 PHYS BLOOD BANK SERV XMATCH: CPT

## 2021-10-10 RX ORDER — SODIUM CHLORIDE 9 MG/ML
1000 INJECTION, SOLUTION INTRAVENOUS
Refills: 0 | Status: DISCONTINUED | OUTPATIENT
Start: 2021-10-10 | End: 2021-10-11

## 2021-10-10 RX ORDER — OXYTOCIN 10 UNIT/ML
333.33 VIAL (ML) INJECTION
Qty: 20 | Refills: 0 | Status: DISCONTINUED | OUTPATIENT
Start: 2021-10-10 | End: 2021-10-11

## 2021-10-10 RX ORDER — OXYTOCIN 10 UNIT/ML
333.33 VIAL (ML) INJECTION
Qty: 20 | Refills: 0 | Status: DISCONTINUED | OUTPATIENT
Start: 2021-10-10 | End: 2021-10-23

## 2021-10-10 RX ORDER — NALOXONE HYDROCHLORIDE 4 MG/.1ML
0.1 SPRAY NASAL
Refills: 0 | Status: DISCONTINUED | OUTPATIENT
Start: 2021-10-10 | End: 2021-10-11

## 2021-10-10 RX ORDER — CITRIC ACID/SODIUM CITRATE 300-500 MG
15 SOLUTION, ORAL ORAL ONCE
Refills: 0 | Status: DISCONTINUED | OUTPATIENT
Start: 2021-10-10 | End: 2021-10-23

## 2021-10-10 RX ORDER — DEXAMETHASONE 0.5 MG/5ML
4 ELIXIR ORAL EVERY 6 HOURS
Refills: 0 | Status: DISCONTINUED | OUTPATIENT
Start: 2021-10-10 | End: 2021-10-11

## 2021-10-10 RX ORDER — SODIUM CHLORIDE 9 MG/ML
1000 INJECTION, SOLUTION INTRAVENOUS
Refills: 0 | Status: DISCONTINUED | OUTPATIENT
Start: 2021-10-10 | End: 2021-10-23

## 2021-10-10 RX ORDER — FENTANYL/BUPIVACAINE/NS/PF 2MCG/ML-.1
250 PLASTIC BAG, INJECTION (ML) INJECTION
Refills: 0 | Status: DISCONTINUED | OUTPATIENT
Start: 2021-10-10 | End: 2021-10-11

## 2021-10-10 RX ORDER — ONDANSETRON 8 MG/1
4 TABLET, FILM COATED ORAL EVERY 6 HOURS
Refills: 0 | Status: DISCONTINUED | OUTPATIENT
Start: 2021-10-10 | End: 2021-10-11

## 2021-10-10 RX ORDER — CITRIC ACID/SODIUM CITRATE 300-500 MG
15 SOLUTION, ORAL ORAL EVERY 6 HOURS
Refills: 0 | Status: DISCONTINUED | OUTPATIENT
Start: 2021-10-10 | End: 2021-10-11

## 2021-10-10 NOTE — OB PROVIDER H&P - ASSESSMENT
35y  @ 40w1ds, GBS neg, PROM clear @ 3:30am, for TOLAC,    Admit to L & D  IV hydration, labs  Continuous EFM & TOCO monitoring  Clear Liquid diet  Pain Management PRN  reevaluate in 2 hours    Dr. Kennedy and Dr. Moy aware.

## 2021-10-10 NOTE — OB PROVIDER H&P - NSHPPHYSICALEXAM_GEN_ALL_CORE
Gen: AOx3, no acute distress  Abd: soft, gravid, non tender, mildly palpable contractions  Ext: No edema bilaterally    EFM:  140/mod/+accels; cat 1  [place]: q6  SVE: 0/0/-3   vertex intact @ 0530am

## 2021-10-10 NOTE — OB PROVIDER H&P - ASSESSMENT
35y  @ 40w1ds, GBS neg, PROM clear @ 3:30am, to be reevaluated.    Admit to L & D  IV hydration, labs  Continuous EFM & TOCO monitoring  Clear Liquid diet  Pain Management PRN  reevaluate in 2 hours    Dr. Kennedy and Dr. Moy aware. 35y  @40w1d, GBS neg, PROM clear @ 3:30am, to be reevaluated.    Admit to L&D  IV hydration, labs  Continuous EFM & TOCO monitoring  Clear Liquid diet  Pain Management PRN  reevaluate in 2 hours    Dr. Kennedy and Dr. Moy aware.

## 2021-10-10 NOTE — OB PROVIDER H&P - VDRL/RPR: DATE, OB PROFILE
All medications were recently refilled by Dr. Loya.    
Patient's wife called the office to have all of patient's medications prescribed by Dr. Holm sent to [facility] from now on.    Please call back if more information is needed.  
23-Mar-2021

## 2021-10-10 NOTE — PROCEDURE NOTE - ADDITIONAL PROCEDURE DETAILS
Patient evaluated at bedside.  Hemodynamically stable, degree of motor block appropriate for epidural medication administered. Pain is well controlled bilaterally. Patient is aware that the anesthesia team is available 24/7, in case she needs more pain medication or has any other anestehsia-related needs or questions. Patient evaluated at bedside.  Hemodynamically stable, degree of motor block appropriate for epidural medication administered. Pain is well controlled bilaterally. Patient is aware that the anesthesia team is available 24/7, in case she needs more pain medication or has any other anestehsia-related needs or questions.  5am: Called to eval contraction pains. Gave 10cc of Bupiva. 0.25%. VSS and pain relieved.

## 2021-10-10 NOTE — OB PROVIDER H&P - NS_OBGYNHISTORY_OBGYN_ALL_OB_FT
Obhx: LTCS- [facility] (2018), 7-5    Gyn: hx of cysts (PCOS), and abnormal pap smears with annual paps. No STDs or fibroids

## 2021-10-10 NOTE — OB PROVIDER H&P - HISTORY OF PRESENT ILLNESS
36 yo  @ 40w1d by first trimester sono, here for leakage of fluid since 3am, which woke her up from sleeping. She denies contractions. Has good fetal movement. This is an IVF pregnancy, patient has a hx of PCOS. She is Rh negative and received rhogam on 21. She is GBS negative.

## 2021-10-10 NOTE — OB PROVIDER H&P - NS_OBGYNHISTORY_OBGYN_ALL_OB_FT
Obhx: LTCS- [facility] (2018), 7-5  Gyn: hx of cysts (PCOS), and abnormal pap smears with annual paps. No STDs or fibroids

## 2021-10-10 NOTE — OB PROVIDER H&P - NSHPPHYSICALEXAM_GEN_ALL_CORE
Vital Signs Last 24 Hrs  T(C): 37.1 (10 Oct 2021 06:07), Max: 37.1 (10 Oct 2021 05:51)  T(F): 98.7 (10 Oct 2021 06:07), Max: 98.7 (10 Oct 2021 05:51)  HR: 77 (10 Oct 2021 07:05) (77 - 86)  BP: 110/69 (10 Oct 2021 07:05) (110/69 - 139/81)  RR: 16 (10 Oct 2021 06:07) (16 - 16)    Gen: AOx3, no acute distress  Abd: soft, gravid, non tender, mildly palpable contractions  Ext: No edema bilaterally    EFM:  140/mod/+accels; cat 1  [place]: q6  SVE: 0/0/-3   vertex intact @ 0530am

## 2021-10-10 NOTE — OB PROVIDER H&P - NSHPLABSRESULTS_GEN_ALL_CORE
GBS neg (9/13/2021)  COVID neg (10/8)  O neg  Rubella: immune  Varicella: immune  VDRL: NR  HepB: negative  HIV: neg    SONO:  @33w6d: EFW 5lb4oz 64%, BPP 8/8, MVP 3.6  @28w6d: BPP 8/8, MVP 3.06, posterior placenta  @28w4d: EFW 4hm16ly 40$, MVP 2.88, posterior placenta,vertex,  @19w3d: posterior low lying placena, vertex, EFW 11oz, no fetal malformations noted   GBS neg (9/13/2021)  COVID neg (10/8)  O neg  Rubella: immune  Varicella: immune  VDRL: NR  HepB: negative  HIV: neg    SONO:  @33w6d: EFW 5lb4oz 64%, BPP 8/8, MVP 3.6  @28w6d: BPP 8/8, MVP 3.06, posterior placenta  @28w4d: EFW 2fd17qd 40%, MVP 2.88, posterior placenta,vertex,  @19w3d: posterior low lying placena, vertex, EFW 11oz, no fetal malformations noted

## 2021-10-10 NOTE — OB PROVIDER H&P - ATTENDING COMMENTS
IUP @ 40.1 wks, h/o LTCS in SROM in early labor    Plan: Admit, Pain management, ARNOLD/Anticipated

## 2021-10-10 NOTE — OB PROVIDER H&P - NSICDXPASTSURGICALHX_GEN_ALL_CORE_FT
PAST SURGICAL HISTORY:  Complication of gastric banding 2011, required blood transfusion, band removed at this time    H/O abdominoplasty 2013    H/O breast augmentation 2011    History of laparoscopic adjustable gastric banding 2005    History of sleeve gastrectomy 2013    S/P unilateral salpingo-oophorectomy left

## 2021-10-11 ENCOUNTER — TRANSCRIPTION ENCOUNTER (OUTPATIENT)
Age: 36
End: 2021-10-11

## 2021-10-11 LAB
AMPHET UR-MCNC: NEGATIVE — SIGNIFICANT CHANGE UP
BARBITURATES UR SCN-MCNC: NEGATIVE — SIGNIFICANT CHANGE UP
BASOPHILS # BLD AUTO: 0.05 K/UL — SIGNIFICANT CHANGE UP (ref 0–0.2)
BASOPHILS NFR BLD AUTO: 0.2 % — SIGNIFICANT CHANGE UP (ref 0–1)
BENZODIAZ UR-MCNC: NEGATIVE — SIGNIFICANT CHANGE UP
BUPRENORPHINE SCREEN, URINE RESULT: NEGATIVE — SIGNIFICANT CHANGE UP
COCAINE METAB.OTHER UR-MCNC: NEGATIVE — SIGNIFICANT CHANGE UP
COVID-19 SPIKE DOMAIN AB INTERP: NEGATIVE — SIGNIFICANT CHANGE UP
COVID-19 SPIKE DOMAIN ANTIBODY RESULT: 0.4 U/ML — SIGNIFICANT CHANGE UP
EOSINOPHIL # BLD AUTO: 0.14 K/UL — SIGNIFICANT CHANGE UP (ref 0–0.7)
EOSINOPHIL NFR BLD AUTO: 0.7 % — SIGNIFICANT CHANGE UP (ref 0–8)
HCT VFR BLD CALC: 27.5 % — LOW (ref 37–47)
HGB BLD-MCNC: 8.1 G/DL — LOW (ref 12–16)
IMM GRANULOCYTES NFR BLD AUTO: 0.7 % — HIGH (ref 0.1–0.3)
L&D DRUG SCREEN, URINE: SIGNIFICANT CHANGE UP
LYMPHOCYTES # BLD AUTO: 12.7 % — LOW (ref 20.5–51.1)
LYMPHOCYTES # BLD AUTO: 2.69 K/UL — SIGNIFICANT CHANGE UP (ref 1.2–3.4)
MCHC RBC-ENTMCNC: 19.6 PG — LOW (ref 27–31)
MCHC RBC-ENTMCNC: 29.5 G/DL — LOW (ref 32–37)
MCV RBC AUTO: 66.6 FL — LOW (ref 81–99)
METHADONE UR-MCNC: NEGATIVE — SIGNIFICANT CHANGE UP
MONOCYTES # BLD AUTO: 1.27 K/UL — HIGH (ref 0.1–0.6)
MONOCYTES NFR BLD AUTO: 6 % — SIGNIFICANT CHANGE UP (ref 1.7–9.3)
NEUTROPHILS # BLD AUTO: 16.9 K/UL — HIGH (ref 1.4–6.5)
NEUTROPHILS NFR BLD AUTO: 79.7 % — HIGH (ref 42.2–75.2)
NRBC # BLD: 0 /100 WBCS — SIGNIFICANT CHANGE UP (ref 0–0)
OPIATES UR-MCNC: NEGATIVE — SIGNIFICANT CHANGE UP
OXYCODONE UR-MCNC: NEGATIVE — SIGNIFICANT CHANGE UP
PCP UR-MCNC: NEGATIVE — SIGNIFICANT CHANGE UP
PLATELET # BLD AUTO: 342 K/UL — SIGNIFICANT CHANGE UP (ref 130–400)
PROPOXYPHENE QUALITATIVE URINE RESULT: NEGATIVE — SIGNIFICANT CHANGE UP
RBC # BLD: 4.13 M/UL — LOW (ref 4.2–5.4)
RBC # FLD: 19.1 % — HIGH (ref 11.5–14.5)
SARS-COV-2 IGG+IGM SERPL QL IA: 0.4 U/ML — SIGNIFICANT CHANGE UP
SARS-COV-2 IGG+IGM SERPL QL IA: NEGATIVE — SIGNIFICANT CHANGE UP
WBC # BLD: 21.19 K/UL — HIGH (ref 4.8–10.8)
WBC # FLD AUTO: 21.19 K/UL — HIGH (ref 4.8–10.8)

## 2021-10-11 RX ORDER — ACETAMINOPHEN 500 MG
975 TABLET ORAL
Refills: 0 | Status: DISCONTINUED | OUTPATIENT
Start: 2021-10-11 | End: 2021-10-12

## 2021-10-11 RX ORDER — IBUPROFEN 200 MG
600 TABLET ORAL EVERY 6 HOURS
Refills: 0 | Status: COMPLETED | OUTPATIENT
Start: 2021-10-11 | End: 2022-09-09

## 2021-10-11 RX ORDER — HYDROCORTISONE 1 %
1 OINTMENT (GRAM) TOPICAL EVERY 6 HOURS
Refills: 0 | Status: DISCONTINUED | OUTPATIENT
Start: 2021-10-11 | End: 2021-10-12

## 2021-10-11 RX ORDER — TETANUS TOXOID, REDUCED DIPHTHERIA TOXOID AND ACELLULAR PERTUSSIS VACCINE, ADSORBED 5; 2.5; 8; 8; 2.5 [IU]/.5ML; [IU]/.5ML; UG/.5ML; UG/.5ML; UG/.5ML
0.5 SUSPENSION INTRAMUSCULAR ONCE
Refills: 0 | Status: DISCONTINUED | OUTPATIENT
Start: 2021-10-11 | End: 2021-10-12

## 2021-10-11 RX ORDER — DIPHENHYDRAMINE HCL 50 MG
25 CAPSULE ORAL EVERY 6 HOURS
Refills: 0 | Status: DISCONTINUED | OUTPATIENT
Start: 2021-10-11 | End: 2021-10-12

## 2021-10-11 RX ORDER — LANOLIN
1 OINTMENT (GRAM) TOPICAL EVERY 6 HOURS
Refills: 0 | Status: DISCONTINUED | OUTPATIENT
Start: 2021-10-11 | End: 2021-10-12

## 2021-10-11 RX ORDER — OXYTOCIN 10 UNIT/ML
333.33 VIAL (ML) INJECTION
Qty: 20 | Refills: 0 | Status: DISCONTINUED | OUTPATIENT
Start: 2021-10-11 | End: 2021-10-12

## 2021-10-11 RX ORDER — PRAMOXINE HYDROCHLORIDE 150 MG/15G
1 AEROSOL, FOAM RECTAL EVERY 4 HOURS
Refills: 0 | Status: DISCONTINUED | OUTPATIENT
Start: 2021-10-11 | End: 2021-10-12

## 2021-10-11 RX ORDER — AER TRAVELER 0.5 G/1
1 SOLUTION RECTAL; TOPICAL EVERY 4 HOURS
Refills: 0 | Status: DISCONTINUED | OUTPATIENT
Start: 2021-10-11 | End: 2021-10-12

## 2021-10-11 RX ORDER — BENZOCAINE 10 %
1 GEL (GRAM) MUCOUS MEMBRANE EVERY 6 HOURS
Refills: 0 | Status: DISCONTINUED | OUTPATIENT
Start: 2021-10-11 | End: 2021-10-12

## 2021-10-11 RX ORDER — MAGNESIUM HYDROXIDE 400 MG/1
30 TABLET, CHEWABLE ORAL
Refills: 0 | Status: DISCONTINUED | OUTPATIENT
Start: 2021-10-11 | End: 2021-10-12

## 2021-10-11 RX ORDER — DIBUCAINE 1 %
1 OINTMENT (GRAM) RECTAL EVERY 6 HOURS
Refills: 0 | Status: DISCONTINUED | OUTPATIENT
Start: 2021-10-11 | End: 2021-10-12

## 2021-10-11 RX ORDER — OXYCODONE HYDROCHLORIDE 5 MG/1
5 TABLET ORAL
Refills: 0 | Status: DISCONTINUED | OUTPATIENT
Start: 2021-10-11 | End: 2021-10-12

## 2021-10-11 RX ORDER — SODIUM CHLORIDE 9 MG/ML
3 INJECTION INTRAMUSCULAR; INTRAVENOUS; SUBCUTANEOUS EVERY 8 HOURS
Refills: 0 | Status: DISCONTINUED | OUTPATIENT
Start: 2021-10-11 | End: 2021-10-12

## 2021-10-11 RX ORDER — KETOROLAC TROMETHAMINE 30 MG/ML
30 SYRINGE (ML) INJECTION ONCE
Refills: 0 | Status: DISCONTINUED | OUTPATIENT
Start: 2021-10-11 | End: 2021-10-11

## 2021-10-11 RX ORDER — IBUPROFEN 200 MG
600 TABLET ORAL EVERY 6 HOURS
Refills: 0 | Status: DISCONTINUED | OUTPATIENT
Start: 2021-10-11 | End: 2021-10-12

## 2021-10-11 RX ORDER — SIMETHICONE 80 MG/1
80 TABLET, CHEWABLE ORAL EVERY 4 HOURS
Refills: 0 | Status: DISCONTINUED | OUTPATIENT
Start: 2021-10-11 | End: 2021-10-12

## 2021-10-11 RX ORDER — OXYCODONE HYDROCHLORIDE 5 MG/1
5 TABLET ORAL ONCE
Refills: 0 | Status: DISCONTINUED | OUTPATIENT
Start: 2021-10-11 | End: 2021-10-12

## 2021-10-11 RX ADMIN — Medication 30 MILLIGRAM(S): at 07:27

## 2021-10-11 RX ADMIN — Medication 975 MILLIGRAM(S): at 15:30

## 2021-10-11 RX ADMIN — Medication 975 MILLIGRAM(S): at 21:47

## 2021-10-11 RX ADMIN — Medication 1 TABLET(S): at 12:32

## 2021-10-11 RX ADMIN — Medication 600 MILLIGRAM(S): at 17:21

## 2021-10-11 RX ADMIN — Medication 975 MILLIGRAM(S): at 22:20

## 2021-10-11 RX ADMIN — Medication 600 MILLIGRAM(S): at 23:49

## 2021-10-11 RX ADMIN — SODIUM CHLORIDE 3 MILLILITER(S): 9 INJECTION INTRAMUSCULAR; INTRAVENOUS; SUBCUTANEOUS at 15:05

## 2021-10-11 RX ADMIN — Medication 30 MILLIGRAM(S): at 07:45

## 2021-10-11 RX ADMIN — SODIUM CHLORIDE 3 MILLILITER(S): 9 INJECTION INTRAMUSCULAR; INTRAVENOUS; SUBCUTANEOUS at 23:51

## 2021-10-11 RX ADMIN — Medication 975 MILLIGRAM(S): at 15:04

## 2021-10-11 RX ADMIN — Medication 975 MILLIGRAM(S): at 10:00

## 2021-10-11 RX ADMIN — Medication 975 MILLIGRAM(S): at 09:38

## 2021-10-11 NOTE — DISCHARGE NOTE OB - PATIENT PORTAL LINK FT
You can access the FollowMyHealth Patient Portal offered by [facility] by registering at the following website: [email or web address]. By joining [employer]’s FollowMyHealth portal, you will also be able to view your health information using other applications (apps) compatible with our system.

## 2021-10-11 NOTE — OB RN DELIVERY SUMMARY - NSSELHIDDEN_OBGYN_ALL_OB_FT
[[email or web address]_ALL_OB_FT:[id number]=],[NS_DeliveryRN_OBGYN_ALL_OB_FT:[id number]=]

## 2021-10-11 NOTE — OB PROVIDER DELIVERY SUMMARY - NSSELHIDDEN_OBGYN_ALL_OB_FT
[[email or web address]_ALL_OB_FT:[id number]=],[NS_DeliveryRN_OBGYN_ALL_OB_FT:[id number]=]

## 2021-10-11 NOTE — DISCHARGE NOTE OB - CARE PROVIDER_API CALL
Estrada Moy)  Obstetrics and Gynecology  [address] [place] [address]  Phone: [phone]  Fax: [phone]  Follow Up Time: 1 month

## 2021-10-11 NOTE — OB PROVIDER DELIVERY SUMMARY - NSPROVIDERDELIVERYNOTE_OBGYN_ALL_OB_FT
Pt became dilated and pushed well over intact perineum, delivery of head in IRVING position, nose and mouth bulb suctioned on perineum, followed by delivery of shoulders and body without difficulty, live female infant, APGARS 9/9, 3-vessel cord + placenta complete, 2nd degree perineal and right labial laceration repaired with chromic, no complications, Successful

## 2021-10-11 NOTE — DISCHARGE NOTE OB - CARE PLAN
1 Principal Discharge DX:	, delivered  Assessment and plan of treatment:	No heavy lifting.   Nothing inside the vagina for 6 weeks: no tampons, douching, sexual intercourse, tub baths or pools.   If you have a fever over 100.4F, severe abdominal pain or heavy vaginal bleeding please call your doctor or go to the emergency room.  Please follow up with your OBGYN in 6 weeks for a postpartum visit.

## 2021-10-12 VITALS
DIASTOLIC BLOOD PRESSURE: 67 MMHG | HEART RATE: 85 BPM | RESPIRATION RATE: 19 BRPM | TEMPERATURE: 96 F | SYSTOLIC BLOOD PRESSURE: 115 MMHG

## 2021-10-12 LAB
BASOPHILS # BLD AUTO: 0.08 K/UL — SIGNIFICANT CHANGE UP (ref 0–0.2)
BASOPHILS NFR BLD AUTO: 0.5 % — SIGNIFICANT CHANGE UP (ref 0–1)
COVID-19 SPIKE DOMAIN AB INTERP: NEGATIVE — SIGNIFICANT CHANGE UP
COVID-19 SPIKE DOMAIN ANTIBODY RESULT: 0.4 U/ML — SIGNIFICANT CHANGE UP
EOSINOPHIL # BLD AUTO: 0.21 K/UL — SIGNIFICANT CHANGE UP (ref 0–0.7)
EOSINOPHIL NFR BLD AUTO: 1.3 % — SIGNIFICANT CHANGE UP (ref 0–8)
FETAL SCREEN: SIGNIFICANT CHANGE UP
HCT VFR BLD CALC: 28 % — LOW (ref 37–47)
HGB BLD-MCNC: 8.3 G/DL — LOW (ref 12–16)
IMM GRANULOCYTES NFR BLD AUTO: 0.6 % — HIGH (ref 0.1–0.3)
LYMPHOCYTES # BLD AUTO: 16.7 % — LOW (ref 20.5–51.1)
LYMPHOCYTES # BLD AUTO: 2.69 K/UL — SIGNIFICANT CHANGE UP (ref 1.2–3.4)
MCHC RBC-ENTMCNC: 19.8 PG — LOW (ref 27–31)
MCHC RBC-ENTMCNC: 29.6 G/DL — LOW (ref 32–37)
MCV RBC AUTO: 66.7 FL — LOW (ref 81–99)
MONOCYTES # BLD AUTO: 0.97 K/UL — HIGH (ref 0.1–0.6)
MONOCYTES NFR BLD AUTO: 6 % — SIGNIFICANT CHANGE UP (ref 1.7–9.3)
NEUTROPHILS # BLD AUTO: 12.11 K/UL — HIGH (ref 1.4–6.5)
NEUTROPHILS NFR BLD AUTO: 74.9 % — SIGNIFICANT CHANGE UP (ref 42.2–75.2)
NRBC # BLD: 0 /100 WBCS — SIGNIFICANT CHANGE UP (ref 0–0)
PLATELET # BLD AUTO: 336 K/UL — SIGNIFICANT CHANGE UP (ref 130–400)
RBC # BLD: 4.2 M/UL — SIGNIFICANT CHANGE UP (ref 4.2–5.4)
RBC # FLD: 19.4 % — HIGH (ref 11.5–14.5)
SARS-COV-2 IGG+IGM SERPL QL IA: 0.4 U/ML — SIGNIFICANT CHANGE UP
SARS-COV-2 IGG+IGM SERPL QL IA: NEGATIVE — SIGNIFICANT CHANGE UP
WBC # BLD: 16.15 K/UL — HIGH (ref 4.8–10.8)
WBC # FLD AUTO: 16.15 K/UL — HIGH (ref 4.8–10.8)

## 2021-10-12 RX ORDER — IBUPROFEN 200 MG
1 TABLET ORAL
Qty: 0 | Refills: 0 | DISCHARGE
Start: 2021-10-12

## 2021-10-12 RX ORDER — ACETAMINOPHEN 500 MG
2 TABLET ORAL
Qty: 0 | Refills: 0 | DISCHARGE
Start: 2021-10-12

## 2021-10-12 RX ADMIN — Medication 975 MILLIGRAM(S): at 08:27

## 2021-10-12 RX ADMIN — Medication 1 TABLET(S): at 12:09

## 2021-10-12 RX ADMIN — Medication 600 MILLIGRAM(S): at 14:41

## 2021-10-12 RX ADMIN — Medication 600 MILLIGRAM(S): at 00:20

## 2021-10-12 RX ADMIN — Medication 600 MILLIGRAM(S): at 06:07

## 2021-10-12 RX ADMIN — SODIUM CHLORIDE 3 MILLILITER(S): 9 INJECTION INTRAMUSCULAR; INTRAVENOUS; SUBCUTANEOUS at 06:09

## 2021-10-12 RX ADMIN — Medication 975 MILLIGRAM(S): at 03:44

## 2021-10-12 RX ADMIN — Medication 975 MILLIGRAM(S): at 04:15

## 2021-10-12 RX ADMIN — Medication 600 MILLIGRAM(S): at 12:09

## 2021-10-12 NOTE — PROGRESS NOTE ADULT - SUBJECTIVE AND OBJECTIVE BOX
PGY3 Note    Patient seen at bedside for evaluation of labor progression, doing well, no complaints. Epidural in place, patient is comfortable.     Vital Signs Last 24 Hrs  T(C): 37.0 (10 Oct 2021 14:16), Max: 37.1 (10 Oct 2021 05:51)  T(F): 98.6 (10 Oct 2021 14:16), Max: 98.7 (10 Oct 2021 05:51)  HR: 79 (10 Oct 2021 15:51) (63 - 107)  BP: 109/66 (10 Oct 2021 15:50) (100/56 - 139/81)  RR: 16 (10 Oct 2021 06:07) (16 - 16)  SpO2: 97% (10 Oct 2021 15:46) (94% - 100%)    EFM: 150/mod randal/+accels  TOCO: irregular  SVE: deferred, last exam /-2 @1305 per Dr. Moy    Medications:  Epidural started @0830      Labs:                        9.1    9.68  )-----------( 369      ( 10 Oct 2021 06:21 )             30.1           ABO RH Interpretation: O NEG (10-10-21 @ 06:21)    Antibody Screen: POS (10-10-21 @ 06:21)    Urinalysis Basic - ( 10 Oct 2021 06:21 )    Color: Yellow / Appearance: Clear / S.012 / pH: x  Gluc: x / Ketone: Negative  / Bili: Negative / Urobili: <2 mg/dL   Blood: x / Protein: Trace / Nitrite: Negative   Leuk Esterase: Negative / RBC: 20 /HPF / WBC x   Sq Epi: x / Non Sq Epi: x / Bacteria: Few        Prenatal Syphilis Test: Nonreact (10-10-21 @ 06:21)              A/P:   35y  @ 40w1ds, GBS neg, PROM clear @ 3:30am, s/p epidural, undergoing TOLAC, doing well    -continue expectant management  -continuous EFM/toco  -monitor vitals  -CLD  -IV hydration  -f/u UDS    Dr. Moy aware  
PGY4 Note    Patient seen at bedside for evaluation of labor progression, doing well, comfortable.     Vital signs  T(F): 99.68 (10-10 @ 21:01), Max: 99.68 (10-10 @ 21:01)  HR: 111 (10-10 @ 22:06)  BP: 123/- (10-10 @ 22:06) (100/56 - 139/81)  RR: 16 (10-10 @ 06:07)  EFM: 160/mod/+accels  TOCO: q 5min  SVE: 4-5/90/-2 cephalic    Medications:  Epidural at 0830      Labs:                        9.1    9.68  )-----------( 369      ( 10 Oct 2021 06:21 )             30.1           Prenatal Syphilis Test: Nonreact (10-10 @ 06:21)  Antibody Screen: POS (10-10-21 @ 06:21)  Antibody Interpretation 1: Anti-D due to RhIG Admin (10-10-21 @ 06:21)    Urinalysis Basic - ( 10 Oct 2021 06:21 )    Color: Yellow / Appearance: Clear / S.012 / pH: x  Gluc: x / Ketone: Negative  / Bili: Negative / Urobili: <2 mg/dL   Blood: x / Protein: Trace / Nitrite: Negative   Leuk Esterase: Negative / RBC: 20 /HPF / WBC x   Sq Epi: x / Non Sq Epi: x / Bacteria: Few      l&d drug screen pending    
Pt seen at bedside, no complaints, nursing    Vital Signs Last 24 Hrs  T(C): 35.6 (12 Oct 2021 08:35), Max: 37.2 (11 Oct 2021 09:30)  T(F): 96 (12 Oct 2021 08:35), Max: 98.9 (11 Oct 2021 09:30)  HR: 85 (12 Oct 2021 08:35) (80 - 94)  BP: 115/67 (12 Oct 2021 08:35) (115/67 - 120/73)  BP(mean): 82 (11 Oct 2021 09:30) (82 - 82)  RR: 19 (12 Oct 2021 08:35) (18 - 20)    Resp - CTA b/l  CVS - S1S2+, RRR  Breasts - soft, NT  Abd - soft, NTND, BS+, uterus - firm  VE - Minimal lochia, perineum- intact  Ext - No Homans                          8.1    21.19 )-----------( 342      ( 11 Oct 2021 20:34 )             27.5                         9.1    9.68  )-----------( 369      ( 10 Oct 2021 06:21 )             30.1     O NEG (baby O POS), Rubella - Immune, Rubeola - Immune, RPR - NR

## 2021-10-12 NOTE — PROGRESS NOTE ADULT - ASSESSMENT
35y  at 40w1ds, GBS neg, PROM clear @ 3:30am, s/p epidural, undergoing TOLAC, doing well    -continue expectant management  -continuous EFM/toco  -monitor vitals  -clear liquid diet  -IV hydration  -f/u UDS    Dr. Moy aware  
IMP: s/p , RH-NEG - PPD # 1 - Doing Well    Plan: dc home (pt request), Rhogam, NSAID's/PNV's, Sitz Baths, f/u office - 6 wks

## 2021-10-15 DIAGNOSIS — E28.2 POLYCYSTIC OVARIAN SYNDROME: ICD-10-CM

## 2021-10-15 DIAGNOSIS — Z28.09 IMMUNIZATION NOT CARRIED OUT BECAUSE OF OTHER CONTRAINDICATION: ICD-10-CM

## 2021-10-15 DIAGNOSIS — O34.211 MATERNAL CARE FOR LOW TRANSVERSE SCAR FROM PREVIOUS CESAREAN DELIVERY: ICD-10-CM

## 2021-10-15 DIAGNOSIS — Z3A.40 40 WEEKS GESTATION OF PREGNANCY: ICD-10-CM

## 2021-10-15 DIAGNOSIS — Z98.84 BARIATRIC SURGERY STATUS: ICD-10-CM

## 2022-05-05 NOTE — OB RN PATIENT PROFILE - PSH
abdomen soft, nontender  taus: sliup, cephalic presentation, anterior placenta, bpp 8/8, eden 13.78  nst in progress Complication of gastric banding  2011, required blood transfusion, band removed at this time  H/O abdominoplasty  2013  H/O breast augmentation  2011  History of laparoscopic adjustable gastric banding  2005  History of sleeve gastrectomy  2013  S/P unilateral salpingo-oophorectomy  left

## 2023-03-16 NOTE — OB RN DELIVERY SUMMARY - NS_REGEMAIL_[email or web address]_ALL_OB
[Follow-Up] : a follow-up visit [Family Member] : family member [Other: _____] : [unfilled] [FreeTextEntry1] : for CKD, HTN, proteinuria Above listed  information was sent to the admitting office

## 2023-08-12 NOTE — OB PROVIDER H&P - INTERNATIONAL TRAVEL
2/2 portal vein thrombosis  Supportive pain control  Monitor for nausea vomiting, abdominal distention  Resolved   No

## 2024-03-04 NOTE — OB RN PATIENT PROFILE - NS PRO REASONS FOR NOT BREASTFEEDING
Is This A New Presentation, Or A Follow-Up?: Skin Lesions
What Type Of Note Output Would You Prefer (Optional)?: Standard Output
How Severe Is Your Skin Lesion?: mild
Has Your Skin Lesion Been Treated?: not been treated
The mother chose not to exclusively breastfeed upon admission.